# Patient Record
Sex: MALE | Race: WHITE
[De-identification: names, ages, dates, MRNs, and addresses within clinical notes are randomized per-mention and may not be internally consistent; named-entity substitution may affect disease eponyms.]

---

## 2018-06-17 ENCOUNTER — HOSPITAL ENCOUNTER (EMERGENCY)
Dept: HOSPITAL 68 - ERH | Age: 23
End: 2018-06-17
Payer: COMMERCIAL

## 2018-06-17 VITALS — SYSTOLIC BLOOD PRESSURE: 130 MMHG | DIASTOLIC BLOOD PRESSURE: 72 MMHG

## 2018-06-17 VITALS — BODY MASS INDEX: 18.61 KG/M2 | WEIGHT: 130 LBS | HEIGHT: 70 IN

## 2018-06-17 DIAGNOSIS — Z48.02: Primary | ICD-10-CM

## 2018-06-17 NOTE — ED UPPER/LOWER EXTREMITY COMPL
History of Present Illness
 
General
Chief Complaint: Suture Removal/Wound Recheck
Stated Complaint: SUTURE REMOVAL
Source: patient
Exam Limitations: no limitations
 
Vital Signs & Intake/Output
Vital Signs & Intake/Output
 Vital Signs
 
 
Date Time Temp Pulse Resp B/P B/P Pulse O2 O2 Flow FiO2
 
     Mean Ox Delivery Rate 
 
06/17 1945 97.4 64 18 130/72  98 Room Air  
 
 
 ED Intake and Output
 
 
 06/18 0000 06/17 1200
 
Intake Total  
 
Output Total  
 
Balance  
 
   
 
Patient 130 lb 
 
Weight  
 
Weight Reported by Patient 
 
Measurement  
 
Method  
 
 
 
Allergies
Coded Allergies:
No Known Allergies (06/10/18)
 
Reconcile Medications
Amoxicillin/Potassium Clav (Augmentin 875-125 Tablet) 875 MG-125 MG TABLET   1 
TAB PO BID infection prevention
Ibuprofen 800 MG TABLET   1 TAB PO TID PRN pain
 
Triage Note:
PT TO ER FOR SUTURE REMOVAL TO RIGHT THUMB.
Triage Nurses Notes Reviewed? yes
Onset: Gradual
Duration: day(s):
Timing: recent history
Severity: mild
Pain/Injury Location:
Right: Hand. 
HPI:
21 yo gentleman
presents for suture removal.
 
He has stitches placed after being bitten his dog 7 days ago.  He tolerated the 
antibiotics well.  He notes the wound has healed well.  He is able to move his 
fingers without problem.  He is otherwise well. 
 
Past History
 
Travel History
Traveled to [place] past 21 day No
 
Medical History
Any Pertinent Medical History? see below for history
Tetanus Vaccine: 06/10/18
 
Surgical History
Surgical History: none
 
Psychosocial History
What is your primary language English
Tobacco Use: Never used
 
Family History
Hx Contributory? No
 
Review of Systems
 
Review of Systems
Constitutional:
Denies: no symptoms, see HPI. 
EENTM:
Reports: no symptoms. 
Respiratory:
Reports: no symptoms. 
Cardiovascular:
Reports: no symptoms. 
Gastrointestinal/Abdominal:
Reports: no symptoms. 
Genitourinary:
Reports: no symptoms. 
Musculoskeletal:
Reports: no symptoms. 
Skin:
Reports: no symptoms. 
Neurological/Psychological:
Reports: no symptoms. 
Hematologic/Endocrine:
Reports: no symptoms. 
Immunological:
Reports: no symptoms. 
All Other Systems: Reviewed and Negative
 
Physical Exam
 
Physical Exam
General Appearance: well developed/nourished, mild distress
Head: atraumatic
Ears, Nose, Throat: normal pharynx, normal ENT inspection
Hand Right: right thumb with well-healed laceration.  No sign of infection
 
Progress
Differential Diagnosis: laceration versus other
Plan of Care:
Sutures removed without problem.  Steri-Strips placed.  Close follow-up advised
 
Departure
 
Departure
Disposition: HOME OR SELF CARE
Condition: Stable
Clinical Impression
Primary Impression: Visit for suture removal
Secondary Impressions: Encounter for wound care
Referrals:
Oneida Hinojosa (PCP/Family)
 
Departure Forms:
Customer Survey
General Discharge Information